# Patient Record
Sex: MALE | Race: WHITE | NOT HISPANIC OR LATINO | URBAN - METROPOLITAN AREA
[De-identification: names, ages, dates, MRNs, and addresses within clinical notes are randomized per-mention and may not be internally consistent; named-entity substitution may affect disease eponyms.]

---

## 2020-01-17 ENCOUNTER — EMERGENCY (EMERGENCY)
Facility: HOSPITAL | Age: 75
LOS: 0 days | Discharge: HOME | End: 2020-01-18
Attending: EMERGENCY MEDICINE | Admitting: EMERGENCY MEDICINE
Payer: MEDICARE

## 2020-01-17 VITALS
HEART RATE: 56 BPM | DIASTOLIC BLOOD PRESSURE: 67 MMHG | TEMPERATURE: 97 F | SYSTOLIC BLOOD PRESSURE: 154 MMHG | RESPIRATION RATE: 18 BRPM | OXYGEN SATURATION: 98 %

## 2020-01-17 DIAGNOSIS — E78.5 HYPERLIPIDEMIA, UNSPECIFIED: ICD-10-CM

## 2020-01-17 DIAGNOSIS — R07.9 CHEST PAIN, UNSPECIFIED: ICD-10-CM

## 2020-01-17 DIAGNOSIS — R07.89 OTHER CHEST PAIN: ICD-10-CM

## 2020-01-17 DIAGNOSIS — M79.602 PAIN IN LEFT ARM: ICD-10-CM

## 2020-01-17 DIAGNOSIS — I10 ESSENTIAL (PRIMARY) HYPERTENSION: ICD-10-CM

## 2020-01-17 DIAGNOSIS — F17.210 NICOTINE DEPENDENCE, CIGARETTES, UNCOMPLICATED: ICD-10-CM

## 2020-01-17 DIAGNOSIS — E11.9 TYPE 2 DIABETES MELLITUS WITHOUT COMPLICATIONS: ICD-10-CM

## 2020-01-17 LAB
ALBUMIN SERPL ELPH-MCNC: 4.5 G/DL — SIGNIFICANT CHANGE UP (ref 3.5–5.2)
ALP SERPL-CCNC: 72 U/L — SIGNIFICANT CHANGE UP (ref 30–115)
ALT FLD-CCNC: 20 U/L — SIGNIFICANT CHANGE UP (ref 0–41)
ANION GAP SERPL CALC-SCNC: 11 MMOL/L — SIGNIFICANT CHANGE UP (ref 7–14)
AST SERPL-CCNC: 19 U/L — SIGNIFICANT CHANGE UP (ref 0–41)
BASOPHILS # BLD AUTO: 0.03 K/UL — SIGNIFICANT CHANGE UP (ref 0–0.2)
BASOPHILS NFR BLD AUTO: 0.4 % — SIGNIFICANT CHANGE UP (ref 0–1)
BILIRUB SERPL-MCNC: 0.2 MG/DL — SIGNIFICANT CHANGE UP (ref 0.2–1.2)
BUN SERPL-MCNC: 29 MG/DL — HIGH (ref 10–20)
CALCIUM SERPL-MCNC: 9.5 MG/DL — SIGNIFICANT CHANGE UP (ref 8.5–10.1)
CHLORIDE SERPL-SCNC: 106 MMOL/L — SIGNIFICANT CHANGE UP (ref 98–110)
CO2 SERPL-SCNC: 22 MMOL/L — SIGNIFICANT CHANGE UP (ref 17–32)
CREAT SERPL-MCNC: 1.6 MG/DL — HIGH (ref 0.7–1.5)
EOSINOPHIL # BLD AUTO: 0.38 K/UL — SIGNIFICANT CHANGE UP (ref 0–0.7)
EOSINOPHIL NFR BLD AUTO: 5 % — SIGNIFICANT CHANGE UP (ref 0–8)
GLUCOSE SERPL-MCNC: 211 MG/DL — HIGH (ref 70–99)
HCT VFR BLD CALC: 36.8 % — LOW (ref 42–52)
HGB BLD-MCNC: 12.9 G/DL — LOW (ref 14–18)
IMM GRANULOCYTES NFR BLD AUTO: 0.4 % — HIGH (ref 0.1–0.3)
LYMPHOCYTES # BLD AUTO: 1.41 K/UL — SIGNIFICANT CHANGE UP (ref 1.2–3.4)
LYMPHOCYTES # BLD AUTO: 18.4 % — LOW (ref 20.5–51.1)
MAGNESIUM SERPL-MCNC: 2.1 MG/DL — SIGNIFICANT CHANGE UP (ref 1.8–2.4)
MCHC RBC-ENTMCNC: 33.8 PG — HIGH (ref 27–31)
MCHC RBC-ENTMCNC: 35.1 G/DL — SIGNIFICANT CHANGE UP (ref 32–37)
MCV RBC AUTO: 96.3 FL — HIGH (ref 80–94)
MONOCYTES # BLD AUTO: 0.45 K/UL — SIGNIFICANT CHANGE UP (ref 0.1–0.6)
MONOCYTES NFR BLD AUTO: 5.9 % — SIGNIFICANT CHANGE UP (ref 1.7–9.3)
NEUTROPHILS # BLD AUTO: 5.36 K/UL — SIGNIFICANT CHANGE UP (ref 1.4–6.5)
NEUTROPHILS NFR BLD AUTO: 69.9 % — SIGNIFICANT CHANGE UP (ref 42.2–75.2)
NRBC # BLD: 0 /100 WBCS — SIGNIFICANT CHANGE UP (ref 0–0)
PLATELET # BLD AUTO: 231 K/UL — SIGNIFICANT CHANGE UP (ref 130–400)
POTASSIUM SERPL-MCNC: 4.5 MMOL/L — SIGNIFICANT CHANGE UP (ref 3.5–5)
POTASSIUM SERPL-SCNC: 4.5 MMOL/L — SIGNIFICANT CHANGE UP (ref 3.5–5)
PROT SERPL-MCNC: 7.5 G/DL — SIGNIFICANT CHANGE UP (ref 6–8)
RBC # BLD: 3.82 M/UL — LOW (ref 4.7–6.1)
RBC # FLD: 12.4 % — SIGNIFICANT CHANGE UP (ref 11.5–14.5)
SODIUM SERPL-SCNC: 139 MMOL/L — SIGNIFICANT CHANGE UP (ref 135–146)
TROPONIN T SERPL-MCNC: <0.01 NG/ML — SIGNIFICANT CHANGE UP
WBC # BLD: 7.66 K/UL — SIGNIFICANT CHANGE UP (ref 4.8–10.8)
WBC # FLD AUTO: 7.66 K/UL — SIGNIFICANT CHANGE UP (ref 4.8–10.8)

## 2020-01-17 PROCEDURE — 93010 ELECTROCARDIOGRAM REPORT: CPT

## 2020-01-17 PROCEDURE — 71045 X-RAY EXAM CHEST 1 VIEW: CPT | Mod: 26

## 2020-01-17 PROCEDURE — 99220: CPT

## 2020-01-17 NOTE — ED PROVIDER NOTE - ATTENDING CONTRIBUTION TO CARE
patient is c/o chest pain, Lt sided, tightness/aching pain, with intermittent radiation to LT arm, denies sob, denies diaphoresis, no dizziness, no syncope, symptoms continued, so had been to [facility] urgent care center and was sent to ED for evaluation. patient with risk factors for CAD, denies risk factors for PE/DVT.   Symptoms does not appears to be aortic dissection.   vitals noted  patient is awake, alert, o x 3, appears comfortable and not in distress.   lungs: CTA, no crackles  abd: +BS, NT, ND, soft  CNS: awake, alert, o x 3, no focal neurologic deficits  A/P: Lt sided chest pain with radiation to Lt arm  labs, EKG, CXR, reevaluation.   Patient was given two ASA 81 mgs.

## 2020-01-17 NOTE — ED ADULT NURSE NOTE - EXPLANATION OF PATIENT'S REASON FOR LEAVING
SHAUN Mena at bedside and explained pt risk for AMA , pt wants to go home, wants to get rest on his own bed

## 2020-01-17 NOTE — ED PROVIDER NOTE - CLINICAL SUMMARY MEDICAL DECISION MAKING FREE TEXT BOX
patient remained stable in ED, improved well, labs/EKG/CXR findings noted and discussed with patient in detail about the results of the diagnostic studies. Discussed with patient about the need for admission to EDOU for further evaluation of his symptoms, he verbalized understanding and agreed. patient is admitted to EDOU in stable condition for further evaluation of his symptoms. All the information discussed with patient and he agreed.

## 2020-01-17 NOTE — ED PROVIDER NOTE - PROGRESS NOTE DETAILS
patient remained stable in ED, labs/EKG/CXR findings noted, discussed with patient about the need for staying in ED OBSERVATION unit for serial EKGs/Anny and further diagnostic studies and cardiology consult as needed, he verbalized understanding and agreed. Patient is Medicare-- 610, 2 MNs. he spoke with ED clerical staff about the observation status as well and he obtained the information, and he agreed with observation status in EDOU.

## 2020-01-17 NOTE — ED PROVIDER NOTE - OBJECTIVE STATEMENT
74y M pmh dm, htn, hld presents for eval of cp. Pt started to have L sided aching cp @1800 today relieved with aspirin, no aggravating factors. Cardiologist Dr. Frank. Denies fever, ha, cough, sob, weakness, numbness, n/v/d/c

## 2020-01-17 NOTE — ED ADULT TRIAGE NOTE - CHIEF COMPLAINT QUOTE
Left sided chest pain x 2 hours. Pt sent in from [facility] [place] [place] for further evaluation

## 2020-01-17 NOTE — ED PROVIDER NOTE - PHYSICAL EXAMINATION
CONST: NAD  EYES: Sclera and conjunctiva clear.   ENT: No nasal discharge. Oropharynx normal appearing, no erythema or exudates. No abscess or swelling. Uvula midline.   NECK: Non-tender, no meningeal signs. normal ROM. supple   CARD: S1 S2; No jvd  RESP: Equal BS B/L, No wheezes, rhonchi or rales. No distress  GI: Soft, non-tender, non-distended. normal BS  MS: Normal ROM in all extremities. pulses 2 +. no calf tenderness or swelling  SKIN: Warm, dry, no acute rashes. Good turgor  NEURO: A&Ox3, No focal deficits. Strength 5/5 with no sensory deficits. Steady gait.

## 2020-01-17 NOTE — ED ADULT NURSE NOTE - CHIEF COMPLAINT QUOTE
Left sided chest pain x 2 hours. Pt sent in from [facility] [place] for further evaluation

## 2020-01-18 VITALS
RESPIRATION RATE: 18 BRPM | SYSTOLIC BLOOD PRESSURE: 168 MMHG | DIASTOLIC BLOOD PRESSURE: 79 MMHG | OXYGEN SATURATION: 98 % | TEMPERATURE: 98 F | HEART RATE: 68 BPM

## 2020-01-18 LAB — TROPONIN T SERPL-MCNC: <0.01 NG/ML — SIGNIFICANT CHANGE UP

## 2020-01-18 PROCEDURE — 93018 CV STRESS TEST I&R ONLY: CPT

## 2020-01-18 PROCEDURE — 78452 HT MUSCLE IMAGE SPECT MULT: CPT | Mod: 26

## 2020-01-18 PROCEDURE — 93010 ELECTROCARDIOGRAM REPORT: CPT

## 2020-01-18 PROCEDURE — 93016 CV STRESS TEST SUPVJ ONLY: CPT

## 2020-01-18 PROCEDURE — 99217: CPT

## 2020-01-18 RX ORDER — ATORVASTATIN CALCIUM 80 MG/1
10 TABLET, FILM COATED ORAL ONCE
Refills: 0 | Status: COMPLETED | OUTPATIENT
Start: 2020-01-18 | End: 2020-01-18

## 2020-01-18 RX ORDER — ADENOSINE 3 MG/ML
60 INJECTION INTRAVENOUS ONCE
Refills: 0 | Status: DISCONTINUED | OUTPATIENT
Start: 2020-01-18 | End: 2020-01-18

## 2020-01-18 RX ORDER — LABETALOL HCL 100 MG
300 TABLET ORAL
Refills: 0 | Status: DISCONTINUED | OUTPATIENT
Start: 2020-01-18 | End: 2020-01-18

## 2020-01-18 RX ORDER — DOXAZOSIN MESYLATE 4 MG
4 TABLET ORAL DAILY
Refills: 0 | Status: DISCONTINUED | OUTPATIENT
Start: 2020-01-18 | End: 2020-01-18

## 2020-01-18 RX ORDER — METFORMIN HYDROCHLORIDE 850 MG/1
850 TABLET ORAL DAILY
Refills: 0 | Status: DISCONTINUED | OUTPATIENT
Start: 2020-01-18 | End: 2020-01-18

## 2020-01-18 RX ORDER — NIFEDIPINE 30 MG
60 TABLET, EXTENDED RELEASE 24 HR ORAL DAILY
Refills: 0 | Status: DISCONTINUED | OUTPATIENT
Start: 2020-01-18 | End: 2020-01-18

## 2020-01-18 RX ORDER — ASPIRIN/CALCIUM CARB/MAGNESIUM 324 MG
324 TABLET ORAL ONCE
Refills: 0 | Status: COMPLETED | OUTPATIENT
Start: 2020-01-18 | End: 2020-01-18

## 2020-01-18 RX ORDER — ALLOPURINOL 300 MG
100 TABLET ORAL DAILY
Refills: 0 | Status: DISCONTINUED | OUTPATIENT
Start: 2020-01-18 | End: 2020-01-18

## 2020-01-18 RX ORDER — ATORVASTATIN CALCIUM 80 MG/1
40 TABLET, FILM COATED ORAL AT BEDTIME
Refills: 0 | Status: DISCONTINUED | OUTPATIENT
Start: 2020-01-18 | End: 2020-01-18

## 2020-01-18 RX ADMIN — Medication 300 MILLIGRAM(S): at 21:16

## 2020-01-18 RX ADMIN — Medication 4 MILLIGRAM(S): at 05:36

## 2020-01-18 RX ADMIN — Medication 300 MILLIGRAM(S): at 05:37

## 2020-01-18 RX ADMIN — Medication 100 MILLIGRAM(S): at 12:05

## 2020-01-18 RX ADMIN — Medication 60 MILLIGRAM(S): at 05:37

## 2020-01-18 RX ADMIN — ATORVASTATIN CALCIUM 10 MILLIGRAM(S): 80 TABLET, FILM COATED ORAL at 21:18

## 2020-01-18 RX ADMIN — METFORMIN HYDROCHLORIDE 850 MILLIGRAM(S): 850 TABLET ORAL at 12:05

## 2020-01-18 RX ADMIN — Medication 324 MILLIGRAM(S): at 05:37

## 2020-01-18 NOTE — ED CDU PROVIDER INITIAL DAY NOTE - OBJECTIVE STATEMENT
73 yo male hx of HTN/DM placed in obs for chest pain. Patient presents c/o Left side chest pain radiated down his left arm started earlier today. pain was sharp and lasted for few hours. He was seen at outside urgent care and sent to ED for evaluation. denies association with diaphoresis/weakness and sob. Denies hormones use/recent hospitalization/ recent surgery/ hx of blood clots and legs swelling. patient currently does not have any chest pain in EDOU.  further denies recent illness/fever/chill/coughing/abd pain/n/v/d/neck and back pain/joints pain and urinary sxs  Last Stress test was 1 year ago with Dr Tay

## 2020-01-18 NOTE — ED CDU PROVIDER DISPOSITION NOTE - PATIENT PORTAL LINK FT
You can access the FollowMyHealth Patient Portal offered by [facility] by registering at the following website: [email or web address]. By joining [facility]’s FollowMyHealth portal, you will also be able to view your health information using other applications (apps) compatible with our system.

## 2020-01-18 NOTE — ED ADULT NURSE REASSESSMENT NOTE - NS ED NURSE REASSESS COMMENT FT1
received pt  form previous day shift ANDRE Christiansen , pt AO x 4 , ambulatory , denies chest pain , denies abdominal pain , denies headache , denies dizziness , no labored breathing , no cough , no SOB , denies nausea , no vomiting noted , wants to go home , explained to pt the consequences of leaving AMA , paged Rajesh DUNN and was aware and was told to come over at the pts bedside soon

## 2020-01-18 NOTE — ED ADULT NURSE REASSESSMENT NOTE - NS ED NURSE REASSESS COMMENT FT1
Pt assessed A/O times 4 Vs stable placed on cardiac monitor denies chest pain no SOB no N/V no dizziness ,pt is seen evaluate by Ed attending  with order for stress test waiting for the test on going  nursing observation .

## 2020-01-18 NOTE — ED CDU PROVIDER DISPOSITION NOTE - CARE PROVIDER_API CALL
Jared Tay)  Cardiology; Interventional Cardiology  [address] [place] [address]  Phone: [phone]  Fax: [phone]  Follow Up Time:

## 2020-01-18 NOTE — ED CDU PROVIDER INITIAL DAY NOTE - NS ED ROS FT
Constitutional: no fever, chills, no recent weight loss, change in appetite or malaise  Eyes: no redness/discharge/pain/vision changes  ENT: no rhinorrhea/ear pain/sore throat  Cardiac: see HPI  Respiratory: No cough or respiratory distress  GI: No nausea, vomiting, diarrhea or abdominal pain.  : No dysuria, frequency, urgency or hematuria  MS: no pain to back or extremities, no loss of ROM, no weakness  Neuro: No headache or weakness. No LOC.  Skin: No skin rash.  Endocrine: + diabetes.

## 2020-01-18 NOTE — ED CDU PROVIDER INITIAL DAY NOTE - PROGRESS NOTE DETAILS
pt. in nad, remains on cardiac monitor. pending stress test results. I spoke with radiology department several times.

## 2020-01-18 NOTE — ED CDU PROVIDER DISPOSITION NOTE - CLINICAL COURSE
74M p/w for chest pain, found to have a + stress test, vs wnl. placed in obs- pt requests and AMA knowing his stress results and cariology plan to cath the pt. Risks of death and severe mobility conveyed to pt and his wife. Pt has capacity. HEMANTH dc

## 2020-01-18 NOTE — ED CDU PROVIDER DISPOSITION NOTE - NSFOLLOWUPINSTRUCTIONS_ED_ALL_ED_FT
Chest Pain    Your Stress test is positive and you are leaving Against Medical Advice    Chest pain can be caused by many different conditions which may or may not be dangerous. Causes include heartburn, lung infections, heart attack, blood clot in lungs, skin infections, strain or damage to muscle, cartilage, or bones, etc. Lab tests or other studies including an electrocardiogram (EKG) may have been performed to find the cause of your pain. Make sure to follow up with a cardiologist or as instructed by your health care professional.    SEEK IMMEDIATE MEDICAL CARE IF YOU HAVE THE FOLLOWING SYMPTOMS: worsening chest pain, coughing up blood, unexplained back/neck/jaw pain, severe abdominal pain, dizziness or lightheadedness, shortness of breath, sweaty or clammy skin, vomiting, or racing heart beat. These symptoms may represent a serious problem that is an emergency. Do not wait to see if the symptoms will go away. Get medical help right away. Call your local emergency services (911 in the [place] Do not drive yourself to the hospital.

## 2020-01-18 NOTE — ED ADULT NURSE REASSESSMENT NOTE - NS ED NURSE REASSESS COMMENT FT1
No s/s of distress noted, pt ambulating well. Received pt from prior RN. Pt placed on cardiac/O2 monitor. Comfort measures offered. Will f/u.

## 2020-01-18 NOTE — ED ADULT NURSE REASSESSMENT NOTE - NS ED NURSE REASSESS COMMENT FT1
Pt reassessed A/O times 4 back from [place] stress test placed on cardiac monitor denies chest pain no SOB no N/V no dizziness ambulate steady   family members at bed site NAD noted ED attending aware , on going nursing observation n.

## 2020-01-18 NOTE — ED CDU PROVIDER SUBSEQUENT DAY NOTE - PROGRESS NOTE DETAILS
patient remain no Chest pain. getting agitated for his stress test result. reviewed patient's stress test images, most likely positive test result (small to moderate reversible defect to inferior wall and apical) will try to reach out to Patient's cardiologist Dr Tay Case d/w with Dr Tay and texted patient's images to him, agree with positive nuc stress test result. will admit for further evaluation discussed result and Dr Tay's recommendation with patient, offered admission. patient doesn't want to stay in hospital for the weekend. prefer to follow up with Dr Tay at the office on Monday. Patient and wife both made aware of positive nuc stress result and aware the potential risks of death, MI and permanent disability if leaving against medical advice. patient aware and understand and agree to sign AMA.   I had extensive discussion of risks and benefits of pursuing further medical evaluation and/or care with patient and any available family/friends; patient still electing to leave against medical advice. Patient is awake, alert, oriented and demonstrates full capacity and insight into illness. Patient aware and encouraged to return immediately to ED or nearest ED if patient decides to change mind regarding care or if patient experiences any new, worsening, or concerning symptoms.

## 2020-01-22 PROBLEM — I10 ESSENTIAL (PRIMARY) HYPERTENSION: Chronic | Status: ACTIVE | Noted: 2020-01-17

## 2020-01-22 PROBLEM — E11.9 TYPE 2 DIABETES MELLITUS WITHOUT COMPLICATIONS: Chronic | Status: ACTIVE | Noted: 2020-01-17

## 2020-01-24 ENCOUNTER — OUTPATIENT (OUTPATIENT)
Dept: OUTPATIENT SERVICES | Facility: HOSPITAL | Age: 75
LOS: 1 days | Discharge: HOME | End: 2020-01-24

## 2020-01-24 DIAGNOSIS — I20.0 UNSTABLE ANGINA: ICD-10-CM

## 2020-01-24 LAB — GLUCOSE BLDC GLUCOMTR-MCNC: 113 MG/DL — HIGH (ref 70–99)

## 2020-01-24 NOTE — CHART NOTE - NSCHARTNOTEFT_GEN_A_CORE
PRE-OP DIAGNOSIS: suspected CAD/abnormal stress    PROCEDURE: [facility] with coronary angiography     Physician: PONCHO Tay  Assistant: Scooter    ANESTHESIA TYPE:  [  ] Sedation  [  ] Local/Regional    ESTIMATED BLOOD LOSS:    10   mL    CONDITION  [  ]Good    IV CONTRAST:  50   mL    FINDINGS    Left Heart Catheterization:  LVEF%: 55  LVEDP: WNL  Normal Coronary Arteries  Slow flow indicating microvascular ds    Coronary circulation: The coronary circulation is right dominant. There was no angiographic evidence for occlusive coronary artery disease. Left main: Normal. The vessel was medium sized. LAD: Normal. The vessel was medium sized. Proximal LAD: Normal. Mid LAD: Normal. Distal LAD: Normal. with slow flow indication microvascular disease. Circumflex: The vessel was medium sized. Proximal circumflex: Normal. Mid circumflex: Normal. Distal circumflex: Normal. 1st obtuse marginal: Normal. RCA: The vessel was medium sized. Proximal RCA: Normal. Mid RCA: Normal. Distal RCA: Normal. Slow flow indicating microvascula disease.     POST-OP DIAGNOSIS  non obst CAD, microvascular disease    PLAN OF CARE  [x ] D/C Home today  c/w medical Mx

## 2020-02-04 DIAGNOSIS — N19 UNSPECIFIED KIDNEY FAILURE: ICD-10-CM

## 2020-02-04 DIAGNOSIS — I25.10 ATHEROSCLEROTIC HEART DISEASE OF NATIVE CORONARY ARTERY WITHOUT ANGINA PECTORIS: ICD-10-CM

## 2020-02-04 DIAGNOSIS — E78.00 PURE HYPERCHOLESTEROLEMIA, UNSPECIFIED: ICD-10-CM

## 2020-02-04 DIAGNOSIS — Z99.2 DEPENDENCE ON RENAL DIALYSIS: ICD-10-CM

## 2020-02-04 DIAGNOSIS — E11.9 TYPE 2 DIABETES MELLITUS WITHOUT COMPLICATIONS: ICD-10-CM

## 2020-02-04 DIAGNOSIS — Z79.82 LONG TERM (CURRENT) USE OF ASPIRIN: ICD-10-CM

## 2020-02-04 DIAGNOSIS — I21.3 ST ELEVATION (STEMI) MYOCARDIAL INFARCTION OF UNSPECIFIED SITE: ICD-10-CM

## 2020-02-04 DIAGNOSIS — I10 ESSENTIAL (PRIMARY) HYPERTENSION: ICD-10-CM

## 2021-10-15 ENCOUNTER — TRANSCRIPTION ENCOUNTER (OUTPATIENT)
Age: 76
End: 2021-10-15

## 2021-10-17 ENCOUNTER — TRANSCRIPTION ENCOUNTER (OUTPATIENT)
Age: 76
End: 2021-10-17

## 2021-10-18 ENCOUNTER — OUTPATIENT (OUTPATIENT)
Dept: INPATIENT UNIT | Facility: HOSPITAL | Age: 76
LOS: 1 days | Discharge: HOME | End: 2021-10-18

## 2021-10-18 ENCOUNTER — EMERGENCY (EMERGENCY)
Facility: HOSPITAL | Age: 76
LOS: 0 days | Discharge: HOME | End: 2021-10-18
Attending: EMERGENCY MEDICINE | Admitting: EMERGENCY MEDICINE
Payer: MEDICARE

## 2021-10-18 ENCOUNTER — APPOINTMENT (OUTPATIENT)
Age: 76
End: 2021-10-18

## 2021-10-18 VITALS
TEMPERATURE: 98 F | HEART RATE: 62 BPM | OXYGEN SATURATION: 94 % | SYSTOLIC BLOOD PRESSURE: 127 MMHG | RESPIRATION RATE: 19 BRPM | DIASTOLIC BLOOD PRESSURE: 59 MMHG

## 2021-10-18 VITALS
OXYGEN SATURATION: 93 % | HEART RATE: 68 BPM | DIASTOLIC BLOOD PRESSURE: 46 MMHG | RESPIRATION RATE: 17 BRPM | TEMPERATURE: 100 F | SYSTOLIC BLOOD PRESSURE: 77 MMHG

## 2021-10-18 VITALS
RESPIRATION RATE: 18 BRPM | HEART RATE: 64 BPM | TEMPERATURE: 99 F | OXYGEN SATURATION: 94 % | DIASTOLIC BLOOD PRESSURE: 54 MMHG | SYSTOLIC BLOOD PRESSURE: 94 MMHG

## 2021-10-18 VITALS — DIASTOLIC BLOOD PRESSURE: 58 MMHG | HEART RATE: 64 BPM | SYSTOLIC BLOOD PRESSURE: 102 MMHG

## 2021-10-18 DIAGNOSIS — Z79.84 LONG TERM (CURRENT) USE OF ORAL HYPOGLYCEMIC DRUGS: ICD-10-CM

## 2021-10-18 DIAGNOSIS — I10 ESSENTIAL (PRIMARY) HYPERTENSION: ICD-10-CM

## 2021-10-18 DIAGNOSIS — U07.1 COVID-19: ICD-10-CM

## 2021-10-18 DIAGNOSIS — E78.5 HYPERLIPIDEMIA, UNSPECIFIED: ICD-10-CM

## 2021-10-18 DIAGNOSIS — I95.9 HYPOTENSION, UNSPECIFIED: ICD-10-CM

## 2021-10-18 DIAGNOSIS — M10.9 GOUT, UNSPECIFIED: ICD-10-CM

## 2021-10-18 DIAGNOSIS — E11.9 TYPE 2 DIABETES MELLITUS WITHOUT COMPLICATIONS: ICD-10-CM

## 2021-10-18 PROBLEM — E78.00 PURE HYPERCHOLESTEROLEMIA, UNSPECIFIED: Chronic | Status: ACTIVE | Noted: 2020-01-24

## 2021-10-18 LAB
ALBUMIN SERPL ELPH-MCNC: 3.8 G/DL — SIGNIFICANT CHANGE UP (ref 3.5–5.2)
ALP SERPL-CCNC: 65 U/L — SIGNIFICANT CHANGE UP (ref 30–115)
ALT FLD-CCNC: 25 U/L — SIGNIFICANT CHANGE UP (ref 0–41)
ANION GAP SERPL CALC-SCNC: 11 MMOL/L — SIGNIFICANT CHANGE UP (ref 7–14)
AST SERPL-CCNC: 26 U/L — SIGNIFICANT CHANGE UP (ref 0–41)
BASOPHILS # BLD AUTO: 0.01 K/UL — SIGNIFICANT CHANGE UP (ref 0–0.2)
BASOPHILS NFR BLD AUTO: 0.3 % — SIGNIFICANT CHANGE UP (ref 0–1)
BILIRUB SERPL-MCNC: 0.3 MG/DL — SIGNIFICANT CHANGE UP (ref 0.2–1.2)
BUN SERPL-MCNC: 30 MG/DL — HIGH (ref 10–20)
CALCIUM SERPL-MCNC: 8.2 MG/DL — LOW (ref 8.5–10.1)
CHLORIDE SERPL-SCNC: 106 MMOL/L — SIGNIFICANT CHANGE UP (ref 98–110)
CO2 SERPL-SCNC: 22 MMOL/L — SIGNIFICANT CHANGE UP (ref 17–32)
CREAT SERPL-MCNC: 1.9 MG/DL — HIGH (ref 0.7–1.5)
EOSINOPHIL # BLD AUTO: 0 K/UL — SIGNIFICANT CHANGE UP (ref 0–0.7)
EOSINOPHIL NFR BLD AUTO: 0 % — SIGNIFICANT CHANGE UP (ref 0–8)
GLUCOSE SERPL-MCNC: 155 MG/DL — HIGH (ref 70–99)
HCT VFR BLD CALC: 34.2 % — LOW (ref 42–52)
HGB BLD-MCNC: 11.9 G/DL — LOW (ref 14–18)
IMM GRANULOCYTES NFR BLD AUTO: 0.5 % — HIGH (ref 0.1–0.3)
LYMPHOCYTES # BLD AUTO: 0.76 K/UL — LOW (ref 1.2–3.4)
LYMPHOCYTES # BLD AUTO: 19.3 % — LOW (ref 20.5–51.1)
MCHC RBC-ENTMCNC: 33.3 PG — HIGH (ref 27–31)
MCHC RBC-ENTMCNC: 34.8 G/DL — SIGNIFICANT CHANGE UP (ref 32–37)
MCV RBC AUTO: 95.8 FL — HIGH (ref 80–94)
MONOCYTES # BLD AUTO: 0.42 K/UL — SIGNIFICANT CHANGE UP (ref 0.1–0.6)
MONOCYTES NFR BLD AUTO: 10.7 % — HIGH (ref 1.7–9.3)
NEUTROPHILS # BLD AUTO: 2.72 K/UL — SIGNIFICANT CHANGE UP (ref 1.4–6.5)
NEUTROPHILS NFR BLD AUTO: 69.2 % — SIGNIFICANT CHANGE UP (ref 42.2–75.2)
NRBC # BLD: 0 /100 WBCS — SIGNIFICANT CHANGE UP (ref 0–0)
PLATELET # BLD AUTO: 156 K/UL — SIGNIFICANT CHANGE UP (ref 130–400)
POTASSIUM SERPL-MCNC: 4.5 MMOL/L — SIGNIFICANT CHANGE UP (ref 3.5–5)
POTASSIUM SERPL-SCNC: 4.5 MMOL/L — SIGNIFICANT CHANGE UP (ref 3.5–5)
PROT SERPL-MCNC: 6.9 G/DL — SIGNIFICANT CHANGE UP (ref 6–8)
RBC # BLD: 3.57 M/UL — LOW (ref 4.7–6.1)
RBC # FLD: 12.4 % — SIGNIFICANT CHANGE UP (ref 11.5–14.5)
SODIUM SERPL-SCNC: 139 MMOL/L — SIGNIFICANT CHANGE UP (ref 135–146)
WBC # BLD: 3.93 K/UL — LOW (ref 4.8–10.8)
WBC # FLD AUTO: 3.93 K/UL — LOW (ref 4.8–10.8)

## 2021-10-18 PROCEDURE — 93010 ELECTROCARDIOGRAM REPORT: CPT

## 2021-10-18 PROCEDURE — 71045 X-RAY EXAM CHEST 1 VIEW: CPT | Mod: 26

## 2021-10-18 PROCEDURE — 99284 EMERGENCY DEPT VISIT MOD MDM: CPT | Mod: CS

## 2021-10-18 RX ORDER — SODIUM CHLORIDE 9 MG/ML
2000 INJECTION, SOLUTION INTRAVENOUS ONCE
Refills: 0 | Status: COMPLETED | OUTPATIENT
Start: 2021-10-18 | End: 2021-10-18

## 2021-10-18 RX ADMIN — SODIUM CHLORIDE 1000 MILLILITER(S): 9 INJECTION, SOLUTION INTRAVENOUS at 15:56

## 2021-10-18 NOTE — ED PROVIDER NOTE - CARE PLAN
Principal Discharge DX:	2019 novel coronavirus disease (COVID-19)   1 Principal Discharge DX:	2019 novel coronavirus disease (COVID-19)  Secondary Diagnosis:	Hypotension

## 2021-10-18 NOTE — CHART NOTE - NSCHARTNOTEFT_GEN_A_CORE
76 y.o. male pmh HTN, DM, COVID-19 + , presenting  for covid MAB infusion. Endosring loss of sense of taste and smell. No slurred speech, no HA, no visual changes no facial droop, no gait abnormalities. Does feel dizzy as IV access is being obtained.     EMS called for abnormal VS of 79/52. Orthostatics +, . Temp 99.8 oral, pulse ox ranging from 89-93. Pt refusing to go to ER. Pt A+O x 3 , wants to go home to take care of sick wife. Does not want to be brought to the ER despite the risks of permanent impairment or death. Pt given 650 cc bolus. Noted some improvement of BP. No cp, sob, cough, pleurisy, palpitations ,parthesias, abdominal pain or back pain. Dr. Mcodnald and NP Taisha notified of situation. Both in agreeance that pt should go to ER for evaluation. Pt;s wife unable to convince pt to go to ER. Return precautions given to pt to call 911 should he feel dizzy, chest pain, nausea or vomit.     Physical Exam    Vital Signs: I have reviewed the initial vital signs.  Constitutional: well-nourished, appears stated age, no acute distress  Eyes: Conjunctiva pink, Sclera clear  Cardiovascular: S1 and S2, regular rate, regular rhythm, well-perfused extremities, radial pulses equal and 2+, calves nonttp, equal in size  Respiratory: unlabored respiratory effort, speaking in full sentences,  clear to auscultation bilaterally no wheezing, rales and rhonchi  Gastrointestinal: soft, non-tender abdomen,  Musculoskeletal: supple neck, no lower extremity edema,   Integumentary: warm, dry, no rashes, lacerations,  Neurologic: awake, alert x 3 non ataxic gait, no facial droop no slurred speech. 76 y.o. male pmh HTN, DM, COVID-19 + , presenting  for covid MAB infusion. Endosring loss of sense of taste and smell. No slurred speech, no HA, no visual changes no facial droop, no gait abnormalities. Does feel dizzy as IV access is being obtained.     EMS called for abnormal VS of 79/52. Orthostatics +, . Temp 99.8 oral, pulse ox ranging from 89-93. Pt refusing to go to ER. Pt A+O x 3 , wants to go home to take care of sick wife. Does not want to be brought to the ER despite the risks of permanent impairment or death. Pt given 650 cc bolus. Noted some improvement of BP. No cp, sob, cough, pleurisy, palpitations ,parthesias, abdominal pain or back pain. Dr. Mcdonald and NP Taisha notified of situation. Both in agreeance that pt should go to ER for evaluation. Pt;s wife unable to convince pt to go to ER. Return precautions given to pt to call 911 should he feel dizzy, chest pain, nausea or vomit.     Physical Exam    Vital Signs: I have reviewed the initial vital signs.  Constitutional: well-nourished, appears stated age, no acute distress  Eyes: Conjunctiva pink, Sclera clear  Cardiovascular: S1 and S2, regular rate, regular rhythm, well-perfused extremities, radial pulses equal and 2+, calves nonttp, equal in size  Respiratory: unlabored respiratory effort, speaking in full sentences,  clear to auscultation bilaterally no wheezing, rales and rhonchi  Gastrointestinal: soft, non-tender abdomen,  Musculoskeletal: supple neck, no lower extremity edema,   Integumentary: warm, dry, no rashes, lacerations,  Neurologic: awake, alert x 3 non ataxic gait, no facial droop no slurred speech.    progress: 12:31: pt on phone with EMS supervising doctor refusing to be brought to ER. Given 750 ccs fluid so far. 76 y.o. male pmh HTN, DM, COVID-19 + , presenting  for covid MAB infusion. Endosring loss of sense of taste and smell. No slurred speech, no HA, no visual changes no facial droop, no gait abnormalities. Does feel dizzy as IV access is being obtained.     EMS called for abnormal VS of 79/52. Orthostatics +, . Temp 99.8 oral, pulse ox ranging from 89-93. Pt refusing to go to ER. Pt A+O x 3 , wants to go home to take care of sick wife. Does not want to be brought to the ER despite the risks of permanent impairment or death. Pt given 650 cc bolus. Noted some improvement of BP. No cp, sob, cough, pleurisy, palpitations ,parthesias, abdominal pain or back pain. Dr. Mcdonald and NP Taisha notified of situation. Both in agreeance that pt should go to ER for evaluation. Pt;s wife unable to convince pt to go to ER. Return precautions given to pt to call 911 should he feel dizzy, chest pain, nausea or vomit.     Physical Exam    Vital Signs: I have reviewed the initial vital signs.  Constitutional: well-nourished, appears stated age, no acute distress  Eyes: Conjunctiva pink, Sclera clear  Cardiovascular: S1 and S2, regular rate, regular rhythm, well-perfused extremities, radial pulses equal and 2+, calves nonttp, equal in size  Respiratory: unlabored respiratory effort, speaking in full sentences,  clear to auscultation bilaterally no wheezing, rales and rhonchi  Gastrointestinal: soft, non-tender abdomen,  Musculoskeletal: supple neck, no lower extremity edema,   Integumentary: warm, dry, no rashes, lacerations,  Neurologic: awake, alert x 3 non ataxic gait, no facial droop no slurred speech.    progress: 12:31: pt on phone with EMS supervising doctor refusing to be brought to ER. Given 750 ccs fluid so far.    progress:12:45 pt spoke with Dr. Antonio [facility] EMS dispatch doctor on [facility]'s phone. Pt will go to ER for eval despite 800 cc fluid bolus. 76 y.o. male pmh HTN, DM, COVID-19 + , presenting  for covid MAB infusion. Endosring loss of sense of taste and smell. No slurred speech, no HA, no visual changes no facial droop, no gait abnormalities. Does feel dizzy. Took antihypertensive meds and had cereal today.    EMS called for abnormal VS of 79/52. Orthostatics +, . Temp 99.8 oral, pulse ox ranging from 89-93. Pt refusing to go to ER. Pt A+O x 3 , wants to go home to take care of sick wife. Does not want to be brought to the ER despite the risks of permanent impairment or death. Pt given 650 cc bolus. Noted some improvement of BP. No cp, sob, cough, pleurisy, palpitations ,parthesias, abdominal pain or back pain. Dr. Mcdonald and NP Taisha notified of situation. Both in agreeance that pt should go to ER for evaluation. Pt;s wife unable to convince pt to go to ER. Return precautions given to pt to call 911 should he feel dizzy, chest pain, nausea or vomitting.     Physical Exam    Vital Signs: I have reviewed the initial vital signs.  Constitutional: well-nourished, appears stated age, no acute distress  Eyes: Conjunctiva pink, Sclera clear  Cardiovascular: S1 and S2, regular rate, regular rhythm, well-perfused extremities, radial pulses equal and 2+, calves nonttp, equal in size  Respiratory: unlabored respiratory effort, speaking in full sentences,  clear to auscultation bilaterally no wheezing, rales and rhonchi  Gastrointestinal: soft, non-tender abdomen,  Musculoskeletal: supple neck, no lower extremity edema,   Integumentary: warm, dry, no rashes, lacerations,  Neurologic: awake, alert x 3 non ataxic gait, no facial droop no slurred speech.    progress: 12:31: pt on phone with EMS supervising doctor refusing to be brought to ER. Given 750 ccs fluid so far.    progress:12:45 pt spoke with Dr. Antonio [facility] EMS dispatch doctor on [facility]'s phone. Pt will go to ER for eval despite 800 cc fluid bolus. Pt feels unstable walking , will go to ER.

## 2021-10-18 NOTE — ED PROVIDER NOTE - NSFOLLOWUPINSTRUCTIONS_ED_ALL_ED_FT
Please follow up with your primary care doctor    to reschedule your monoclonal infusion please call [phone]    You have tested POSITIVE for the novel coronavirus (COVID-19). Upon discharge, you must self-quarantine for 14 days, Please wear a face mask if you are around other individuals. Try to avoid contact with house members, family, and friends for the duration of this quarantine. Please follow up with your primary care physician within 2-3 weeks of your discharge from the hospital. Please take all medications as prescribed. If you experience any worsening or recurrence of your symptoms, particularly worsening or high fever, shortness of breathe, extreme fatigue, or bloody cough please call [phone] immediately or report to the nearest Emergency Department.

## 2021-10-18 NOTE — ED PROVIDER NOTE - PROGRESS NOTE DETAILS
I supervised PA fellow care BH: VSS, BP improved in ED with IVF, sat>90% RA with exertion, very well appearing, clinical picture c/w coronavirus, unlikely underlying cardiac or vascular complications, results d/w pt, states feels well enough to go home, no GOFF, ambulating in ED w/o difficulty, advised they do not require hospitalization at this time although if symptoms change or worsen, may need to be hospitalized at a later date, also advised to self isolate.    The patient was given detailed return precautions and advised to return to the emergency department if any new symptoms developed, symptoms worsened or for any concerns. The patient was offered the opportunity to ask questions and verbalized that they understand the diagnosis and discharge instructions.

## 2021-10-18 NOTE — ED PROVIDER NOTE - NS ED ROS FT
Constitutional: (-) fever  Eyes/ENT: (-) blurry vision, (-) epistaxis  Cardiovascular: (-) chest pain, (-) syncope, (-) palpitatons  Respiratory: (-) cough, (-) shortness of breath  Gastrointestinal: (-) vomiting, (-) diarrhea  Musculoskeletal: (-) neck pain, (-) back pain, (-) joint pain  Integumentary: (-) rash, (-) edema  Neurological: (-) headache, (-) altered mental status  Psychiatric: (-) hallucinations  Allergic/Immunologic: (-) pruritus

## 2021-10-18 NOTE — ED PROVIDER NOTE - PATIENT PORTAL LINK FT
You can access the FollowMyHealth Patient Portal offered by [facility] by registering at the following website: [email or web address]. By joining [facility]’s FollowMyHealth portal, you will also be able to view your health information using other applications (apps) compatible with our system.

## 2021-10-18 NOTE — ED PROVIDER NOTE - OBJECTIVE STATEMENT
77 y/o M PMHx HTN, DMII, COIVD s/p 4 dyas present to the ED for HoTN. pt currently has COVID and was at clinic today to receive infusion. while checking his vitals he was found to be hypotensive. EMS was called and he was brought in. pt denies any SOB Chest pain,  dizziness. headache, nausea , vomiting, pt is on HTN medication, denies this ever happening before and took his regular dose.

## 2021-10-18 NOTE — ED PROVIDER NOTE - PHYSICAL EXAMINATION
Physical Exam    Vital Signs: I have reviewed the initial vital signs.  Constitutional: well-nourished, appears stated age, no acute distress  Eyes: Conjunctiva pink, Sclera clear, PERRLA, EOMI.  Cardiovascular: S1 and S2, regular rate,, well-perfused extremities, radial pulses equal and 2+  Respiratory: unlabored respiratory effort, clear to auscultation bilaterally no wheezing, rales and rhonchi  Gastrointestinal: soft, non-tender abdomen, no pulsatile mass, normal bowl sounds  Musculoskeletal: supple neck, no lower extremity edema, no midline tenderness  Integumentary: warm, dry, no rash  Neurologic: awake, alert, cranial nerves II-XII grossly intact, extremities’ motor and sensory functions grossly intact, steady gait  Psychiatric: appropriate mood, appropriate affect

## 2021-10-20 DIAGNOSIS — Z02.9 ENCOUNTER FOR ADMINISTRATIVE EXAMINATIONS, UNSPECIFIED: ICD-10-CM

## 2021-12-15 PROBLEM — Z00.00 ENCOUNTER FOR PREVENTIVE HEALTH EXAMINATION: Status: ACTIVE | Noted: 2021-12-15

## 2021-12-17 ENCOUNTER — APPOINTMENT (OUTPATIENT)
Age: 76
End: 2021-12-17
Payer: MEDICARE

## 2021-12-17 VITALS
SYSTOLIC BLOOD PRESSURE: 120 MMHG | DIASTOLIC BLOOD PRESSURE: 60 MMHG | HEART RATE: 55 BPM | OXYGEN SATURATION: 97 % | WEIGHT: 218 LBS | RESPIRATION RATE: 12 BRPM | BODY MASS INDEX: 28.89 KG/M2 | HEIGHT: 73 IN

## 2021-12-17 DIAGNOSIS — J12.9 VIRAL PNEUMONIA, UNSPECIFIED: ICD-10-CM

## 2021-12-17 PROCEDURE — 71046 X-RAY EXAM CHEST 2 VIEWS: CPT

## 2021-12-17 PROCEDURE — 99202 OFFICE O/P NEW SF 15 MIN: CPT | Mod: 25

## 2021-12-17 NOTE — HISTORY OF PRESENT ILLNESS
[Initial Evaluation] : an initial evaluation of [Dry Cough] : dry cough [Dyspnea] : dyspnea [Currently Experiencing] : The patient is currently experiencing symptoms. [None] : No associated symptoms are reported

## 2021-12-17 NOTE — PROCEDURE
[FreeTextEntry1] : CXR PA and Lateral \par The costophrenic and cardiophrenic angles are sharp\par The christian parenchyma shows some faint infiltrates.  No consolidations or nodules \par The Mediastinum is within normal limits\par No pleural effusions\par

## 2022-02-15 NOTE — ASU PATIENT PROFILE, ADULT - FALL HARM RISK - UNIVERSAL INTERVENTIONS
Bed in lowest position, wheels locked, appropriate side rails in place/Call bell, personal items and telephone in reach/Instruct patient to call for assistance before getting out of bed or chair/Non-slip footwear when patient is out of bed/[place] to call system/Physically safe environment - no spills, clutter or unnecessary equipment/Purposeful Proactive Rounding/Room/bathroom lighting operational, light cord in reach

## 2022-02-15 NOTE — ASU PATIENT PROFILE, ADULT - NSICDXPASTMEDICALHX_GEN_ALL_CORE_FT
PAST MEDICAL HISTORY:  DM (diabetes mellitus)     Gout     High cholesterol     HTN (hypertension)

## 2022-02-16 ENCOUNTER — OUTPATIENT (OUTPATIENT)
Dept: OUTPATIENT SERVICES | Facility: HOSPITAL | Age: 77
LOS: 1 days | Discharge: HOME | End: 2022-02-16

## 2022-02-16 VITALS
DIASTOLIC BLOOD PRESSURE: 67 MMHG | RESPIRATION RATE: 18 BRPM | SYSTOLIC BLOOD PRESSURE: 141 MMHG | HEART RATE: 52 BPM | TEMPERATURE: 98 F | OXYGEN SATURATION: 98 %

## 2022-02-16 VITALS
OXYGEN SATURATION: 98 % | HEIGHT: 73 IN | TEMPERATURE: 98 F | SYSTOLIC BLOOD PRESSURE: 165 MMHG | HEART RATE: 52 BPM | WEIGHT: 229.94 LBS | RESPIRATION RATE: 18 BRPM | DIASTOLIC BLOOD PRESSURE: 71 MMHG

## 2022-02-16 RX ORDER — CANDESARTAN CILEXETIL 8 MG/1
1 TABLET ORAL
Qty: 0 | Refills: 0 | DISCHARGE

## 2022-02-16 RX ORDER — IBUPROFEN 200 MG
1 TABLET ORAL
Qty: 20 | Refills: 0
Start: 2022-02-16

## 2022-02-16 RX ORDER — ALLOPURINOL 300 MG
1 TABLET ORAL
Qty: 0 | Refills: 0 | DISCHARGE

## 2022-02-16 RX ORDER — ROSUVASTATIN CALCIUM 5 MG/1
1 TABLET ORAL
Qty: 0 | Refills: 0 | DISCHARGE

## 2022-02-16 RX ORDER — METFORMIN HYDROCHLORIDE 850 MG/1
1 TABLET ORAL
Qty: 0 | Refills: 0 | DISCHARGE

## 2022-02-16 RX ORDER — LABETALOL HCL 100 MG
1 TABLET ORAL
Qty: 0 | Refills: 0 | DISCHARGE

## 2022-02-16 RX ORDER — TERAZOSIN HYDROCHLORIDE 10 MG/1
0 CAPSULE ORAL
Qty: 0 | Refills: 0 | DISCHARGE

## 2022-02-16 RX ORDER — OMEGA-3 ACID ETHYL ESTERS 1 G
0 CAPSULE ORAL
Qty: 0 | Refills: 0 | DISCHARGE

## 2022-02-16 RX ORDER — NIFEDIPINE 30 MG
1 TABLET, EXTENDED RELEASE 24 HR ORAL
Qty: 0 | Refills: 0 | DISCHARGE

## 2022-02-16 NOTE — ASU PREOP CHECKLIST - HEART RATE (BEATS/MIN)
Telephone Encounter by Kim Jones RN at 06/26/18 11:44 AM     Author:  Kim Jones RN Service:  (none) Author Type:  Registered Nurse     Filed:  06/26/18 11:44 AM Encounter Date:  6/26/2018 Status:  Signed     :  Kim Jones RN (Registered Nurse)            Please schedule bumped pts from weeks of 8/12/18 in frozen slots at [facility].      We are holding those slots for the bumped pts.    Routed to [facility] to schedule[KW1.1M]      Revision History        User Key Date/Time User Provider Type Action    > KW1.1 06/26/18 11:44 AM Kim Jones, RN Registered Nurse Sign    M - Manual             52

## 2022-02-16 NOTE — ASU DISCHARGE PLAN (ADULT/PEDIATRIC) - ASU DC SPECIAL INSTRUCTIONSFT

## 2022-02-16 NOTE — ASU DISCHARGE PLAN (ADULT/PEDIATRIC) - CARE PROVIDER_API CALL
Ezequiel Carney)  Orthopaedic Surgery  [address] [place] [address]  Phone: [phone]  Fax: [phone]  Follow Up Time:
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
## 2022-02-16 NOTE — ASU DISCHARGE PLAN (ADULT/PEDIATRIC) - NS MD DC FALL RISK RISK
For information on Fall & Injury Prevention, visit: [email or web address] OR  [email or web address] OR  https://www.cdc.gov/steadi/patient.html

## 2022-02-22 DIAGNOSIS — I10 ESSENTIAL (PRIMARY) HYPERTENSION: ICD-10-CM

## 2022-02-22 DIAGNOSIS — G56.01 CARPAL TUNNEL SYNDROME, RIGHT UPPER LIMB: ICD-10-CM

## 2022-02-22 DIAGNOSIS — Z79.84 LONG TERM (CURRENT) USE OF ORAL HYPOGLYCEMIC DRUGS: ICD-10-CM

## 2022-02-22 DIAGNOSIS — E11.9 TYPE 2 DIABETES MELLITUS WITHOUT COMPLICATIONS: ICD-10-CM

## 2022-03-02 ENCOUNTER — APPOINTMENT (OUTPATIENT)
Age: 77
End: 2022-03-02

## 2024-10-30 ENCOUNTER — OUTPATIENT (OUTPATIENT)
Dept: OUTPATIENT SERVICES | Facility: HOSPITAL | Age: 79
LOS: 1 days | End: 2024-10-30
Payer: MEDICARE

## 2024-10-30 ENCOUNTER — RESULT REVIEW (OUTPATIENT)
Age: 79
End: 2024-10-30

## 2024-10-30 DIAGNOSIS — M79.643 PAIN IN UNSPECIFIED HAND: ICD-10-CM

## 2024-10-30 PROCEDURE — 73130 X-RAY EXAM OF HAND: CPT | Mod: 50

## 2024-10-30 PROCEDURE — 73130 X-RAY EXAM OF HAND: CPT | Mod: 26,50

## 2024-10-31 DIAGNOSIS — M79.643 PAIN IN UNSPECIFIED HAND: ICD-10-CM
